# Patient Record
Sex: MALE | Employment: OTHER | URBAN - METROPOLITAN AREA
[De-identification: names, ages, dates, MRNs, and addresses within clinical notes are randomized per-mention and may not be internally consistent; named-entity substitution may affect disease eponyms.]

---

## 2019-10-28 ENCOUNTER — HOSPITAL ENCOUNTER (EMERGENCY)
Facility: CLINIC | Age: 69
Discharge: HOME | End: 2019-10-28
Attending: EMERGENCY MEDICINE | Admitting: EMERGENCY MEDICINE
Payer: COMMERCIAL

## 2019-10-28 VITALS
BODY MASS INDEX: 29.39 KG/M2 | WEIGHT: 198.41 LBS | RESPIRATION RATE: 14 BRPM | SYSTOLIC BLOOD PRESSURE: 179 MMHG | HEIGHT: 69 IN | TEMPERATURE: 97.4 F | HEART RATE: 97 BPM | OXYGEN SATURATION: 97 % | DIASTOLIC BLOOD PRESSURE: 109 MMHG

## 2019-10-28 DIAGNOSIS — I82.402 ACUTE DEEP VEIN THROMBOSIS (DVT) OF LEFT LOWER EXTREMITY, UNSPECIFIED VEIN (H): ICD-10-CM

## 2019-10-28 PROCEDURE — 99283 EMERGENCY DEPT VISIT LOW MDM: CPT

## 2019-10-28 PROCEDURE — 25000132 ZZH RX MED GY IP 250 OP 250 PS 637: Performed by: EMERGENCY MEDICINE

## 2019-10-28 RX ORDER — ASPIRIN 81 MG/1
81 TABLET, CHEWABLE ORAL DAILY
COMMUNITY

## 2019-10-28 RX ADMIN — APIXABAN 10 MG: 5 TABLET, FILM COATED ORAL at 17:46

## 2019-10-28 SDOH — HEALTH STABILITY: MENTAL HEALTH: HOW OFTEN DO YOU HAVE A DRINK CONTAINING ALCOHOL?: NEVER

## 2019-10-28 ASSESSMENT — MIFFLIN-ST. JEOR: SCORE: 1663.76

## 2019-10-28 ASSESSMENT — ENCOUNTER SYMPTOMS: NUMBNESS: 0

## 2019-10-28 NOTE — ED AVS SNAPSHOT
Emergency Department  [address] [place] [address]  Phone:  [phone]  Fax:  [phone]                                    Jc Mcmanus   MRN: [id number]    Department:   Emergency Department   Date of Visit:  10/28/2019           After Visit Summary Signature Page    I have received my discharge instructions, and my questions have been answered. I have discussed any challenges I see with this plan with the nurse or doctor.    ..........................................................................................................................................  Patient/Patient Representative Signature      ..........................................................................................................................................  Patient Representative Print Name and Relationship to Patient    ..................................................               ................................................  Date                                   Time    ..........................................................................................................................................  Reviewed by Signature/Title    ...................................................              ..............................................  Date                                               Time          22EPIC Rev 08/18

## 2019-10-28 NOTE — ED TRIAGE NOTES
[facility] and had positive leg ultrasound for DVT. Triage done through .

## 2019-10-28 NOTE — ED PROVIDER NOTES
History     Chief Complaint:  Deep Vein Thrombosis    The history is provided by the patient. A  was used (Bahamian).      Jc Mcmanus is a 69 year old male with a history of a deep vein thrombosis who presents to the emergency department today for evaluation of a DVT in his left leg. The patient reports yesterday he noticed a lump in the calf on his left leg, and reports it has been associated with pain and swelling in the extremity. He denies any numbness or changes to the sensation in the leg. Prior to presenting here the patient was seen at [facility], where a DVT was identified; he is here to obtain an anticoagulant so he can return to [place] tomorrow morning. The patient's previous DVT was in 2006 located in the same extremity, at diagnosis he was hospitalized and started on Coumadin which he was on for a year; the reason for his clot was not identified.       [facility] Results, [facility], 10/28/19  US, Left Lower Extremity Venous Doppler  There is occlusive and nonocclusive deep vein thrombosis noted in the left popliteal and peroneal veins.       Allergies:  No Known Drug Allergies     Medications:    Aspirin  Allopurinol    Past Medical History:    Deep vein thrombosis    Past Surgical History:    Surgical history reviewed. No pertinent surgical history.    Family History:    Family history reviewed. No pertinent family history.    Social History:  The patient was accompanied to the ED by his wife.  Smoking Status: Never Smoker  Smokeless Tobacco: Never Used  Alcohol Use: Positive  Drug Use: Negative      Review of Systems   Cardiovascular: Positive for leg swelling (left).   Musculoskeletal:        Leg pain, left   Neurological: Negative for numbness.   All other systems reviewed and are negative.      Physical Exam     Patient Vitals for the past 24 hrs:   BP Temp Temp src Pulse Resp SpO2 Height Weight   10/28/19 1558 (!) 157/94 97.4  F  "(36.3  C) Temporal 51 14 100 % 1.75 m (5' 8.9\") 90 kg (198 lb 6.6 oz)      Physical Exam    Eye:  Pupils are equal, round, and reactive.  Extraocular movements intact.    ENT:  No rhinorrhea.  Moist mucus membranes.  Normal tongue and tonsil.    Cardiac:  Regular rate and rhythm.  No murmurs, gallops, or rubs.    Pulmonary:  Clear to auscultation bilaterally.  No wheezes, rales, or rhonchi.    Abdomen:  Positive bowel sounds.  Abdomen is soft and non-distended, without focal tenderness.    Musculoskeletal:  Normal movement of all extremities without evidence for deficit. There is focal tenderness and swelling behind the left knee and extending into the calf. No distal edema noted. Strong pulses with warm feet bilaterally.    Skin:  Warm and dry without rashes.    Neurologic:  Non-focal exam without asymmetric weakness or numbness.     Psychiatric:  Normal affect with appropriate interaction with examiner.     Emergency Department Course     Interventions:  1746 Eliquis 10 mg PO    Emergency Department Course:  1623 Nursing notes and vitals reviewed.  1648 I performed an exam of the patient as documented above.   1747 Patient was rechecked and updated prior to discharge.     Findings and plan explained to the Patient and spouse. Patient discharged home with instructions regarding supportive care, medications, and reasons to return. The importance of close follow-up was reviewed. The patient was prescribed Eliquis.     I personally reviewed the treatment and follow up plan with the Patient and spouse and answered all related questions prior to discharge.     Impression & Plan      Medical Decision Making:  This delightful 69-year-old gentleman with one prior DVT in his left leg greater than 10 years ago presents to us with another DVT.  He is a resident of [place] and flew to this country a few weeks ago to visit his family.  He noted increasing swelling and discomfort to the left leg, prompting his DVT study which " shows a fairly distal DVT, inferior to the knee.  He otherwise has no other complaints of chest pain or shortness of breath.  I [facility]  His leg exam shows strong pulses with mild swelling and no significant pain.    The patient is returning to [place] tomorrow at 10 AM.  I have elected to give him his first dose of Eliquis here which will cover him for the next 12 hours.  He has international insurance which will pay for $800 worth of prescription drugs.  I spoke to the pharmacy nearby and they noted that they would be able to give him a one-week supply of the higher dose of Eliquis (10 mg twice daily) for approximately $250 cash price.  However, my nurse was able to find a free voucher for a starter pack of Eliquis which she was provided with as well.  He was advised to fill this prescription but that it is imperative that he follow-up with his doctor when he returns to [place].  The patient's questions were answered and is comfortable with the plan for initiating anticoagulation therapy, understanding he can return at any point for worsening of condition or other emergent concerns.        Diagnosis:    ICD-10-CM    1. Acute deep vein thrombosis (DVT) of left lower extremity, unspecified vein (H) I82.402        Disposition:  The patient is discharged to home.     Discharge Medications:  New Prescriptions    APIXABAN ANTICOAGULANT (ELIQUIS STARTER PACK) 5 MG TABLET    Take 2 tablets (10 mg) by mouth 2 times daily for 7 days, THEN 1 tablet (5 mg) 2 times daily for 23 days.       Scribe Disclosure:  I, Izzy Diamond, am serving as a scribe at 4:47 PM on 10/28/2019 to document services personally performed by Trierweiler, Chad A, MD based on my observations and the provider's statements to me.    10/28/2019    EMERGENCY DEPARTMENT       Trierweiler, Chad A, MD  10/29/19 7135